# Patient Record
Sex: MALE | Race: WHITE | NOT HISPANIC OR LATINO | Employment: FULL TIME | URBAN - METROPOLITAN AREA
[De-identification: names, ages, dates, MRNs, and addresses within clinical notes are randomized per-mention and may not be internally consistent; named-entity substitution may affect disease eponyms.]

---

## 2017-10-03 PROBLEM — N18.4 CKD (CHRONIC KIDNEY DISEASE), STAGE IV: Status: ACTIVE | Noted: 2017-10-03

## 2017-10-04 PROBLEM — R06.02 SHORTNESS OF BREATH: Status: ACTIVE | Noted: 2017-10-04

## 2017-10-05 PROBLEM — R07.9 CHEST PAIN: Status: ACTIVE | Noted: 2017-10-05

## 2017-11-15 PROBLEM — I10 ESSENTIAL HYPERTENSION: Status: ACTIVE | Noted: 2017-11-15

## 2018-04-30 ENCOUNTER — PATIENT OUTREACH (OUTPATIENT)
Dept: ADMINISTRATIVE | Facility: HOSPITAL | Age: 63
End: 2018-04-30

## 2018-04-30 NOTE — PROGRESS NOTES
Health Maintenance Due   Topic Date Due    Fecal Occult Blood Test (FOBT)/[employer]Kit  1955    Zoster Vaccine  08/26/2014     Pre-visit outreach via mail

## 2018-04-30 NOTE — LETTER
April 30, 2018    David Elizalde  [address]  [facility] [address]             [facility]  [address]  [facility] [address]  Phone: [phone] Dear Mr. Elizalde:    [facility] is committed to your overall health.  To help you get the most out of each of your visits, we will review your information to make sure you are up to date on all of your recommended tests and/or procedures.      Dr. Gonzalez   has found that your chart shows you may be due for the following:    Shingles immunization  Colon cancer screening    If you have had any of the above done at another facility, please bring the records or information with you so that your record at [facility] will be complete.  If you would like to schedule any of these, please contact me.    If you are currently taking medication, please bring it with you to your appointment for review.    If you have any questions or concerns, please don't hesitate to call.    Sincerely,  Poppy Farnsworth  Clinical Care Coordinator  [facility] [address] [place] [address]  Phone: [phone]   Fax: [phone]

## 2018-05-10 PROBLEM — D50.9 MICROCYTIC ANEMIA: Status: ACTIVE | Noted: 2018-05-10

## 2018-05-10 PROBLEM — R73.9 HYPERGLYCEMIA: Status: ACTIVE | Noted: 2018-05-10

## 2018-05-10 PROBLEM — N18.1 CKD (CHRONIC KIDNEY DISEASE), SYMPTOM MANAGEMENT ONLY, STAGE 1: Status: ACTIVE | Noted: 2017-10-03

## 2018-07-17 PROBLEM — C20 MALIGNANT TUMOR OF RECTUM: Status: ACTIVE | Noted: 2018-07-17

## 2019-09-19 PROBLEM — E66.01 MORBID OBESITY WITH BMI OF 45.0-49.9, ADULT: Status: ACTIVE | Noted: 2019-09-19

## 2019-09-19 PROBLEM — R74.8 ELEVATED LIPASE: Status: ACTIVE | Noted: 2019-09-19

## 2019-09-19 PROBLEM — R10.13 EPIGASTRIC PAIN: Status: ACTIVE | Noted: 2019-09-19

## 2021-01-22 ENCOUNTER — PATIENT MESSAGE (OUTPATIENT)
Dept: ADMINISTRATIVE | Facility: OTHER | Age: 66
End: 2021-01-22

## 2021-02-24 PROBLEM — R07.9 CHEST PAIN: Status: RESOLVED | Noted: 2017-10-05 | Resolved: 2021-02-24

## 2021-02-24 PROBLEM — R06.02 SHORTNESS OF BREATH: Status: RESOLVED | Noted: 2017-10-04 | Resolved: 2021-02-24

## 2021-04-19 PROBLEM — G62.9 NEUROPATHY: Status: ACTIVE | Noted: 2021-04-19

## 2021-04-19 PROBLEM — Z79.4 TYPE 2 DIABETES MELLITUS WITH HYPERGLYCEMIA, WITH LONG-TERM CURRENT USE OF INSULIN: Status: ACTIVE | Noted: 2021-04-19

## 2021-04-19 PROBLEM — E11.65 TYPE 2 DIABETES MELLITUS WITH HYPERGLYCEMIA, WITH LONG-TERM CURRENT USE OF INSULIN: Status: ACTIVE | Noted: 2021-04-19

## 2022-10-11 PROBLEM — R73.9 HYPERGLYCEMIA: Status: RESOLVED | Noted: 2018-05-10 | Resolved: 2022-10-11

## 2022-10-11 PROBLEM — E66.01 MORBID OBESITY WITH BMI OF 45.0-49.9, ADULT: Status: RESOLVED | Noted: 2019-09-19 | Resolved: 2022-10-11

## 2022-10-11 PROBLEM — R74.8 ELEVATED LIPASE: Status: RESOLVED | Noted: 2019-09-19 | Resolved: 2022-10-11

## 2022-10-11 PROBLEM — R10.13 EPIGASTRIC PAIN: Status: RESOLVED | Noted: 2019-09-19 | Resolved: 2022-10-11

## 2023-02-22 PROBLEM — Z78.9 INTOLERANCE OF CONTINUOUS POSITIVE AIRWAY PRESSURE (CPAP) VENTILATION: Status: ACTIVE | Noted: 2023-02-22

## 2024-11-20 ENCOUNTER — HOSPITAL ENCOUNTER (OUTPATIENT)
Dept: RADIOLOGY | Facility: HOSPITAL | Age: 69
Discharge: HOME | End: 2024-11-20
Attending: UROLOGY
Payer: MEDICARE

## 2024-11-20 ENCOUNTER — OFFICE VISIT (OUTPATIENT)
Dept: UROLOGY | Facility: CLINIC | Age: 69
End: 2024-11-20
Payer: MEDICARE

## 2024-11-20 ENCOUNTER — PATIENT MESSAGE (OUTPATIENT)
Dept: UROLOGY | Facility: CLINIC | Age: 69
End: 2024-11-20

## 2024-11-20 VITALS — BODY MASS INDEX: 38.96 KG/M2 | HEIGHT: 74 IN | WEIGHT: 303.56 LBS

## 2024-11-20 DIAGNOSIS — N20.0 NEPHROLITHIASIS: ICD-10-CM

## 2024-11-20 DIAGNOSIS — R31.29 HEMATURIA, MICROSCOPIC: ICD-10-CM

## 2024-11-20 DIAGNOSIS — R10.9 FLANK PAIN: ICD-10-CM

## 2024-11-20 DIAGNOSIS — N13.2 HYDRONEPHROSIS WITH OBSTRUCTING CALCULUS: ICD-10-CM

## 2024-11-20 DIAGNOSIS — N20.1 URETERAL STONE: Primary | ICD-10-CM

## 2024-11-20 LAB
BILIRUBIN, UA POC OHS: NEGATIVE
BLOOD, UA POC OHS: ABNORMAL
CLARITY, UA POC OHS: CLEAR
COLOR, UA POC OHS: YELLOW
GLUCOSE, UA POC OHS: NEGATIVE
KETONES, UA POC OHS: NEGATIVE
LEUKOCYTES, UA POC OHS: ABNORMAL
NITRITE, UA POC OHS: NEGATIVE
PH, UA POC OHS: 6
PROTEIN, UA POC OHS: NEGATIVE
SPECIFIC GRAVITY, UA POC OHS: <=1.005
UROBILINOGEN, UA POC OHS: 0.2

## 2024-11-20 PROCEDURE — 74018 RADEX ABDOMEN 1 VIEW: CPT | Mod: TC,FY,PO

## 2024-11-20 PROCEDURE — 99999 PR PBB SHADOW E&M-EST. PATIENT-LVL III: CPT | Mod: PBBFAC,,, | Performed by: UROLOGY

## 2024-11-20 PROCEDURE — 74018 RADEX ABDOMEN 1 VIEW: CPT | Mod: 26,,, | Performed by: RADIOLOGY

## 2024-11-20 NOTE — PROGRESS NOTES
Subjective:       Patient ID: David Elizalde is a 69 y.o. male.    Chief Complaint: Nephrolithiasis (Left side)    HPI    69-year-old with a long history of kidney stones.  He has had multiple previous stone procedures including an open flank incision 30-40 years ago.  He has been having left flank pain and underwent a CT scan.  I personally reviewed the CT scan images with him.  There is a 6-7 mm stone in the mid left ureter causing mild proximal hydroureteronephrosis.  There was also a large 1-1/2 cm stone in the right renal pelvis.  He does have a history of gout in his currently taking allopurinol and has had no flare ups in years.  It is unsure previous stones were uric acid.  Today he is pain-free.  Urine dipstick shows negative for nitrites, glucose, protein, positive for leukocytes, 3+blood    Past Medical History:   Diagnosis Date    Cancer 07/2018    Gout     Hypertension     Sleep apnea     Tachycardia, unspecified     Unspecified disorder of kidney and ureter      Past Surgical History:   Procedure Laterality Date    COLON SURGERY  08/2018    [facility] [place]    COLONOSCOPY  07/12/2019    Dr De León  received report     COLONOSCOPY  01/20/2022    Dr De León    ENDOSCOPIC ULTRASOUND OF LOWER GASTROINTESTINAL TRACT Left 07/17/2018    Procedure: ULTRASOUND, LOWER GI TRACT, ENDOSCOPIC;  Surgeon: Uriel Allen MD;  Location: [facility];  Service: Endoscopy;  Laterality: Left;    EYE SURGERY      cataracts    FLEXIBLE SIGMOIDOSCOPY Left 07/17/2018    Procedure: SIGMOIDOSCOPY, FLEXIBLE;  Surgeon: Uriel Allen MD;  Location: [facility];  Service: Endoscopy;  Laterality: Left;    KIDNEY STONE SURGERY         Current Outpatient Medications:     allopurinoL (ZYLOPRIM) 300 MG tablet, Take 1 tablet (300 mg total) by mouth once daily., Disp: 90 tablet, Rfl: 3    aspirin (ECOTRIN) 81 MG EC tablet, Take by mouth., Disp: , Rfl:     blood-glucose meter Misc, 1 each by Misc.(Non-Drug;  Combo Route) route 3 (three) times daily. for 1 day, Disp: 1 each, Rfl: 0    diltiaZEM (CARDIZEM CD) 240 MG 24 hr capsule, Take 1 capsule (240 mg total) by mouth once daily., Disp: 90 capsule, Rfl: 3    gabapentin (NEURONTIN) 100 MG capsule, Take 1 capsule (100 mg total) by mouth every evening., Disp: 90 capsule, Rfl: 3    ketorolac (TORADOL) 10 mg tablet, Take 1 tablet (10 mg total) by mouth every 6 (six) hours as needed for Pain., Disp: 20 tablet, Rfl: 0    lancets Misc, Take blood glucose TID., Disp: 200 each, Rfl: 3    levothyroxine (SYNTHROID) 125 MCG tablet, TAKE 1 TABLET BEFORE BREAKFAST., Disp: 90 tablet, Rfl: 2    metFORMIN (GLUCOPHAGE) 500 MG tablet, Take 2 tablets (1,000 mg total) by mouth 2 (two) times daily with meals., Disp: 360 tablet, Rfl: 1    neomycin-polymyxin-dexamethasone (MAXITROL) 3.5mg/mL-10,000 unit/mL-0.1 % DrpS, Place 1 drop into the right eye every evening., Disp: , Rfl:     ondansetron (ZOFRAN-ODT) 4 MG TbDL, Take 1 tablet (4 mg total) by mouth every 6 (six) hours as needed (Nausea/vomiting)., Disp: 20 tablet, Rfl: 0    RESTASIS 0.05 % ophthalmic emulsion, Place 1 drop into both eyes 2 (two) times daily., Disp: , Rfl:     rosuvastatin (CRESTOR) 20 MG tablet, Take 1 tablet (20 mg total) by mouth once daily., Disp: 90 tablet, Rfl: 3    sildenafiL (VIAGRA) 25 MG tablet, TAKE 1 TABLET ONE TIME DAILY AS NEEDED FOR ERECTILE DYSFUNCTION., Disp: 10 tablet, Rfl: 3    tamsulosin (FLOMAX) 0.4 mg Cap, Take 1 capsule (0.4 mg total) by mouth once daily. for 10 days, Disp: 10 capsule, Rfl: 0    traZODone (DESYREL) 50 MG tablet, Take 1 tablet (50 mg total) by mouth every evening., Disp: 90 tablet, Rfl: 3    TRUE METRIX GLUCOSE TEST STRIP Strp, TEST BLOOD GLUCOSE THREE TIMES DAILY, Disp: 300 strip, Rfl: 3    oxyCODONE-acetaminophen (PERCOCET) 5-325 mg per tablet, Take 1 tablet by mouth every 4 (four) hours as needed (Pain not controlled by Toradol)., Disp: 20 tablet, Rfl: 0      Review of Systems    Constitutional:  Negative for fever.   Genitourinary:  Negative for dysuria and hematuria.       Objective:      Physical Exam  Vitals reviewed.   Constitutional:       Appearance: He is well-developed.   Pulmonary:      Effort: Pulmonary effort is normal.   Abdominal:      Tenderness: There is no right CVA tenderness or left CVA tenderness.   Skin:     Findings: No rash.   Neurological:      Mental Status: He is alert and oriented to person, place, and time.         Assessment:       1. Ureteral stone    2. Nephrolithiasis    3. Hydronephrosis with obstructing calculus    4. Flank pain    5. Hematuria, microscopic        Plan:       Ureteral stone    Nephrolithiasis  -     Ambulatory referral/consult to Urology  -     POCT Urinalysis(Instrument)  -     X-Ray KUB; Future; Expected date: 11/20/2024  -     oxyCODONE-acetaminophen (PERCOCET) 5-325 mg per tablet; Take 1 tablet by mouth every 4 (four) hours as needed (Pain not controlled by Toradol).  Dispense: 20 tablet; Refill: 0    Hydronephrosis with obstructing calculus    Flank pain    Hematuria, microscopic       I personally reviewed the CT scan images and made an independent interpretation of the test completed by another healthcare professional.    We will give a longer trial of passage.  If he is unable to pass the stone will plan ureteroscopy next week.  We also discussed treatment options for the right kidney stone.    KUB is reviewed today and the stone in the ureter is not clearly seen.  Stones in the left and right kidney are visualized.

## 2024-11-21 ENCOUNTER — TELEPHONE (OUTPATIENT)
Dept: UROLOGY | Facility: CLINIC | Age: 69
End: 2024-11-21
Payer: MEDICARE

## 2024-11-21 DIAGNOSIS — N20.1 URETERAL STONE: Primary | ICD-10-CM

## 2024-11-21 NOTE — TELEPHONE ENCOUNTER
Please schedule left ureteroscopy with laser at [facility].  Okay to add next week.

## 2024-11-22 ENCOUNTER — TELEPHONE (OUTPATIENT)
Dept: UROLOGY | Facility: CLINIC | Age: 69
End: 2024-11-22
Payer: MEDICARE

## 2024-11-22 RX ORDER — OXYCODONE AND ACETAMINOPHEN 5; 325 MG/1; MG/1
1 TABLET ORAL EVERY 4 HOURS PRN
Qty: 20 TABLET | Refills: 0 | Status: SHIPPED | OUTPATIENT
Start: 2024-11-22

## 2024-11-22 NOTE — ADDENDUM NOTE
Addended by: TERESA HOSKINS on: 11/22/2024 09:29 AM     Modules accepted: Orders     Dr. Francia Mclaughlin notified that patient was passing blood tinged urine.

## 2024-11-22 NOTE — TELEPHONE ENCOUNTER
----- Message from Camryn sent at 11/22/2024  8:49 AM CST -----  Contact: self  Type:  Patient Returning Call    Who Called:  the patient  Who Left Message for Patient:  Brooke  Does the patient know what this is regarding?:  yes  Best Call Back Number:  [phone]  Additional Information:  pt states he has tried calling for preop but they told him they do not have the papers, please call pt for help.

## 2024-11-26 PROBLEM — N20.1 URETERAL STONE: Status: ACTIVE | Noted: 2024-11-26

## 2024-11-29 ENCOUNTER — TELEPHONE (OUTPATIENT)
Dept: UROLOGY | Facility: CLINIC | Age: 69
End: 2024-11-29
Payer: MEDICARE

## 2024-11-29 NOTE — TELEPHONE ENCOUNTER
----- Message from Mar sent at 11/27/2024  8:27 AM CST -----  Type:  Hospital F/U Appointment Request    Caller is requesting a Hospital F/U appointment.     Name of Caller:Pt    When is the first available appointment?01/06/24    Symptoms:2 wk f/u    Would the patient rather a call back or a response via [facility]? Call back    Best Call Back Number:[phone]    Additional Information: Pt is wanting to get in with Dr Richardson. Please call back to advise. Thanks!

## 2024-12-18 ENCOUNTER — OFFICE VISIT (OUTPATIENT)
Dept: UROLOGY | Facility: CLINIC | Age: 69
End: 2024-12-18
Payer: MEDICARE

## 2024-12-18 VITALS — WEIGHT: 164.25 LBS | BODY MASS INDEX: 21.08 KG/M2 | HEIGHT: 74 IN

## 2024-12-18 DIAGNOSIS — N20.0 KIDNEY STONE: Primary | ICD-10-CM

## 2024-12-18 LAB
BILIRUBIN, UA POC OHS: NEGATIVE
BLOOD, UA POC OHS: ABNORMAL
CLARITY, UA POC OHS: CLEAR
COLOR, UA POC OHS: YELLOW
GLUCOSE, UA POC OHS: NEGATIVE
KETONES, UA POC OHS: NEGATIVE
LEUKOCYTES, UA POC OHS: NEGATIVE
NITRITE, UA POC OHS: NEGATIVE
PH, UA POC OHS: 5.5
PROTEIN, UA POC OHS: 100
SPECIFIC GRAVITY, UA POC OHS: 1.02
UROBILINOGEN, UA POC OHS: 0.2

## 2024-12-18 PROCEDURE — 99999 PR PBB SHADOW E&M-EST. PATIENT-LVL III: CPT | Mod: PBBFAC,,, | Performed by: UROLOGY

## 2024-12-18 PROCEDURE — 81003 URINALYSIS AUTO W/O SCOPE: CPT | Mod: QW,S$GLB,, | Performed by: UROLOGY

## 2024-12-18 PROCEDURE — 1126F AMNT PAIN NOTED NONE PRSNT: CPT | Mod: CPTII,S$GLB,, | Performed by: UROLOGY

## 2024-12-18 PROCEDURE — 99213 OFFICE O/P EST LOW 20 MIN: CPT | Mod: S$GLB,,, | Performed by: UROLOGY

## 2024-12-18 PROCEDURE — 3044F HG A1C LEVEL LT 7.0%: CPT | Mod: CPTII,S$GLB,, | Performed by: UROLOGY

## 2024-12-18 PROCEDURE — 3288F FALL RISK ASSESSMENT DOCD: CPT | Mod: CPTII,S$GLB,, | Performed by: UROLOGY

## 2024-12-18 PROCEDURE — 3060F POS MICROALBUMINURIA REV: CPT | Mod: CPTII,S$GLB,, | Performed by: UROLOGY

## 2024-12-18 PROCEDURE — 3008F BODY MASS INDEX DOCD: CPT | Mod: CPTII,S$GLB,, | Performed by: UROLOGY

## 2024-12-18 PROCEDURE — 1101F PT FALLS ASSESS-DOCD LE1/YR: CPT | Mod: CPTII,S$GLB,, | Performed by: UROLOGY

## 2024-12-18 PROCEDURE — 3066F NEPHROPATHY DOC TX: CPT | Mod: CPTII,S$GLB,, | Performed by: UROLOGY

## 2024-12-18 PROCEDURE — 1159F MED LIST DOCD IN RCRD: CPT | Mod: CPTII,S$GLB,, | Performed by: UROLOGY

## 2024-12-18 NOTE — PROGRESS NOTES
Subjective:       Patient ID: David Elizalde is a 69 y.o. male.    Chief Complaint: Post-op Evaluation (2 wk po from [facility])    HPI    69-year-old with bilateral kidney stones.  He underwent ureteroscopy for an obstructing left ureteral stone several weeks ago.  He removed the stent at home.  Today he is pain-free.  All stones in the left side were treated.  I again reviewed the CT scan with him.  There is a large stone in the right renal pelvis measuring about 1.9 cm.  There was other nonobstructing stones in the right kidney as well.  We discussed treatment options for the right-sided stones.  We discussed shockwave lithotripsy which may not be effective given the size of the stone.  Alternatively we discussed repeat ureteroscopy on the right side.  Stone analysis is reviewed and is calcium oxalate monohydrate.    Review of Systems   Constitutional:  Negative for fever.   Genitourinary:  Negative for dysuria and hematuria.       Objective:      Physical Exam  Vitals reviewed.   Constitutional:       Appearance: He is well-developed.   Pulmonary:      Effort: Pulmonary effort is normal.   Skin:     Findings: No rash.   Neurological:      Mental Status: He is alert and oriented to person, place, and time.         Assessment:       1. Kidney stone        Plan:       Kidney stone  -     POCT Urinalysis(Instrument)        Plan right-sided ureteroscopy with Thulium laser lithotripsy.

## 2024-12-19 ENCOUNTER — TELEPHONE (OUTPATIENT)
Dept: UROLOGY | Facility: CLINIC | Age: 69
End: 2024-12-19
Payer: MEDICARE

## 2024-12-19 DIAGNOSIS — N20.1 URETERAL STONE: Primary | ICD-10-CM

## 2025-01-14 PROBLEM — N20.0 KIDNEY STONE: Status: ACTIVE | Noted: 2025-01-14

## 2025-01-15 ENCOUNTER — TELEPHONE (OUTPATIENT)
Dept: UROLOGY | Facility: CLINIC | Age: 70
End: 2025-01-15
Payer: MEDICARE

## 2025-01-15 NOTE — TELEPHONE ENCOUNTER
----- Message from Paola sent at 1/15/2025 10:28 AM CST -----  Regarding: Hosp F/U  Contact: pt  Type:  Hosp F/U Appointment Request    Caller is requesting a hosp F/U appointment.      Name of Caller:pt    When is the first available appointment? None in the timeframe needed    Date of discharge. Outpt sx on 1/14    When is appt needed?  Approx 1/31    Symptoms:kidney stone sx    Would the patient rather a call back or a response via [facility]? Call back    Best Call Back Number:[phone]    Additional Information:  pt was told to be seen 2 weeks after sx

## 2025-01-18 ENCOUNTER — NURSE TRIAGE (OUTPATIENT)
Dept: ADMINISTRATIVE | Facility: CLINIC | Age: 70
End: 2025-01-18
Payer: MEDICARE

## 2025-01-19 NOTE — TELEPHONE ENCOUNTER
Patient is 4 days post of kidney stone removal and stent placement. He c/o constipation and has a colostomy. Patient has questions about taking OTC laxatives. Dispo is ED/UC/PCP triage. Per Dr. Georges, patient can take any OTC laxative including abimael lax or colace. Patient was advised and verbalizes understanding. Advised the patient to call back with any further questions or if symptoms worsen.        Reason for Disposition   [1] No stool or gas from COLOSTOMY > 48 hours (2 days) AND [2] abdominal pain or vomiting    Additional Information   Negative: [1] SEVERE abdominal pain (e.g., excruciating) AND [2] present > 1 hour   Negative: [1] Bleeding from stoma tissue (stoma is moist, pink to red-colored tissue) AND [2] won't stop after 10 minutes of direct pressure (using correct technique)   Negative: Bloody stool (blood clots; or passing blood without stool)   Negative: Black or tarry bowel movements  (Exception: Chronic-unchanged black-grey BMs AND is taking iron pills or Pepto-Bismol.)   Negative: Stoma separates from the surrounding skin at the suture line (e.g., gaping wound next to stoma)   Negative: Stoma turns purple or black   Negative: [1] No stool or gas from ILEOSTOMY > 6 hours AND [2] abdominal pain or vomiting   Negative: [1] No stool or gas from ILEOSTOMY > 6 hours AND [2] no improvement after using Care Advice    Protocols used: Ostomy Symptoms and [id number]

## 2025-01-30 ENCOUNTER — OFFICE VISIT (OUTPATIENT)
Dept: UROLOGY | Facility: CLINIC | Age: 70
End: 2025-01-30
Payer: MEDICARE

## 2025-01-30 VITALS — HEIGHT: 74 IN | WEIGHT: 293.63 LBS | BODY MASS INDEX: 37.68 KG/M2

## 2025-01-30 DIAGNOSIS — N20.0 KIDNEY STONE: Primary | ICD-10-CM

## 2025-01-30 LAB
BILIRUBIN, UA POC OHS: NEGATIVE
BLOOD, UA POC OHS: ABNORMAL
CLARITY, UA POC OHS: CLEAR
COLOR, UA POC OHS: YELLOW
GLUCOSE, UA POC OHS: NEGATIVE
KETONES, UA POC OHS: NEGATIVE
LEUKOCYTES, UA POC OHS: ABNORMAL
NITRITE, UA POC OHS: NEGATIVE
PH, UA POC OHS: 5.5
PROTEIN, UA POC OHS: 30
SPECIFIC GRAVITY, UA POC OHS: 1.01
UROBILINOGEN, UA POC OHS: 0.2

## 2025-01-30 PROCEDURE — 1125F AMNT PAIN NOTED PAIN PRSNT: CPT | Mod: CPTII,S$GLB,, | Performed by: UROLOGY

## 2025-01-30 PROCEDURE — 99999 PR PBB SHADOW E&M-EST. PATIENT-LVL III: CPT | Mod: PBBFAC,,, | Performed by: UROLOGY

## 2025-01-30 PROCEDURE — 81003 URINALYSIS AUTO W/O SCOPE: CPT | Mod: QW,S$GLB,, | Performed by: UROLOGY

## 2025-01-30 PROCEDURE — 99213 OFFICE O/P EST LOW 20 MIN: CPT | Mod: S$GLB,,, | Performed by: UROLOGY

## 2025-01-30 PROCEDURE — 3008F BODY MASS INDEX DOCD: CPT | Mod: CPTII,S$GLB,, | Performed by: UROLOGY

## 2025-01-30 PROCEDURE — 3288F FALL RISK ASSESSMENT DOCD: CPT | Mod: CPTII,S$GLB,, | Performed by: UROLOGY

## 2025-01-30 PROCEDURE — 1101F PT FALLS ASSESS-DOCD LE1/YR: CPT | Mod: CPTII,S$GLB,, | Performed by: UROLOGY

## 2025-01-30 PROCEDURE — 1159F MED LIST DOCD IN RCRD: CPT | Mod: CPTII,S$GLB,, | Performed by: UROLOGY

## 2025-01-30 NOTE — PROGRESS NOTES
Subjective:       Patient ID: David Elizalde is a 69 y.o. male.    Chief Complaint: Post-op Evaluation    HPI    69 year old with kidney stones.  He underwent left-sided ureteroscopy several weeks ago and now is recovering from right-sided ureteroscopy for large right UPJ stone.  He is doing well.  He is pain-free today.    Review of Systems   Constitutional:  Negative for fever.   Genitourinary:  Negative for dysuria and hematuria.       Objective:      Physical Exam  Vitals reviewed.   Constitutional:       Appearance: He is well-developed.   Pulmonary:      Effort: Pulmonary effort is normal.   Skin:     Findings: No rash.   Neurological:      Mental Status: He is alert and oriented to person, place, and time.         Assessment:       1. Kidney stone        Plan:       Kidney stone  -     POCT Urinalysis(Instrument)      Recommendations:   Increase hydration 2 liters fluid per day.      Low Oxalate diet     Add Citrate (lemon juice daily)

## 2025-02-10 ENCOUNTER — TELEPHONE (OUTPATIENT)
Dept: UROLOGY | Facility: CLINIC | Age: 70
End: 2025-02-10
Payer: MEDICARE

## 2025-04-15 PROBLEM — Z79.4 TYPE 2 DIABETES MELLITUS WITH MICROALBUMINURIA, WITH LONG-TERM CURRENT USE OF INSULIN: Status: ACTIVE | Noted: 2025-04-15

## 2025-04-15 PROBLEM — R80.9 TYPE 2 DIABETES MELLITUS WITH MICROALBUMINURIA, WITH LONG-TERM CURRENT USE OF INSULIN: Status: ACTIVE | Noted: 2025-04-15

## 2025-04-15 PROBLEM — C20 MALIGNANT TUMOR OF RECTUM: Status: RESOLVED | Noted: 2018-07-17 | Resolved: 2025-04-15

## 2025-04-15 PROBLEM — E11.29 TYPE 2 DIABETES MELLITUS WITH MICROALBUMINURIA, WITH LONG-TERM CURRENT USE OF INSULIN: Status: ACTIVE | Noted: 2025-04-15

## 2025-05-28 ENCOUNTER — TELEPHONE (OUTPATIENT)
Dept: PHARMACY | Facility: CLINIC | Age: 70
End: 2025-05-28
Payer: MEDICARE

## 2025-05-29 NOTE — TELEPHONE ENCOUNTER
[facility]/Population Health Chart Review & Patient Outreach Details For Medication Adherence Project    Reason for Outreach Encounter: 3rd Party payor non-compliance report ([employer], [employer], [employer], etc)  2.  Patient Outreach Method: Reviewed patient chart   3.   Medication in question:    Diabetes Medications              metFORMIN (GLUCOPHAGE) 500 MG tablet TAKE 2 TABLETS TWICE DAILY WITH MEALS                 metformin  last filled  4/7 for 90 day supply    4.  Reviewed and or Updates Made To: Patient Chart  5. Outreach Outcomes and/or actions taken: Patient filled medication and is on track to be adherent  Additional Notes: